# Patient Record
Sex: FEMALE | Race: BLACK OR AFRICAN AMERICAN | URBAN - METROPOLITAN AREA
[De-identification: names, ages, dates, MRNs, and addresses within clinical notes are randomized per-mention and may not be internally consistent; named-entity substitution may affect disease eponyms.]

---

## 2022-01-20 ENCOUNTER — LAB OUTSIDE AN ENCOUNTER (OUTPATIENT)
Dept: URBAN - METROPOLITAN AREA CLINIC 105 | Facility: CLINIC | Age: 67
End: 2022-01-20

## 2022-01-20 ENCOUNTER — WEB ENCOUNTER (OUTPATIENT)
Dept: URBAN - METROPOLITAN AREA CLINIC 105 | Facility: CLINIC | Age: 67
End: 2022-01-20

## 2022-01-20 ENCOUNTER — DASHBOARD ENCOUNTERS (OUTPATIENT)
Age: 67
End: 2022-01-20

## 2022-01-20 ENCOUNTER — OFFICE VISIT (OUTPATIENT)
Dept: URBAN - METROPOLITAN AREA CLINIC 105 | Facility: CLINIC | Age: 67
End: 2022-01-20
Payer: MEDICARE

## 2022-01-20 VITALS
HEIGHT: 61 IN | WEIGHT: 101.8 LBS | SYSTOLIC BLOOD PRESSURE: 123 MMHG | HEART RATE: 89 BPM | DIASTOLIC BLOOD PRESSURE: 83 MMHG | TEMPERATURE: 97.2 F | BODY MASS INDEX: 19.22 KG/M2

## 2022-01-20 DIAGNOSIS — G89.29 OTHER CHRONIC PAIN: ICD-10-CM

## 2022-01-20 DIAGNOSIS — R10.84 GENERALIZED ABDOMINAL PAIN: ICD-10-CM

## 2022-01-20 DIAGNOSIS — M54.6 PAIN IN THORACIC SPINE: ICD-10-CM

## 2022-01-20 DIAGNOSIS — K21.9 GASTROESOPHAGEAL REFLUX DISEASE, UNSPECIFIED WHETHER ESOPHAGITIS PRESENT: ICD-10-CM

## 2022-01-20 DIAGNOSIS — Z86.19 HISTORY OF HELICOBACTER PYLORI INFECTION: ICD-10-CM

## 2022-01-20 DIAGNOSIS — R11.2 NAUSEA WITH VOMITING, UNSPECIFIED: ICD-10-CM

## 2022-01-20 DIAGNOSIS — R63.4 UNINTENTIONAL WEIGHT LOSS: ICD-10-CM

## 2022-01-20 PROBLEM — 79890006: Status: ACTIVE | Noted: 2022-01-20

## 2022-01-20 PROCEDURE — 99204 OFFICE O/P NEW MOD 45 MIN: CPT | Performed by: INTERNAL MEDICINE

## 2022-01-20 RX ORDER — ONDANSETRON 4 MG/1
1 TABLET ON THE TONGUE AND ALLOW TO DISSOLVE TABLET, ORALLY DISINTEGRATING ORAL ONCE A DAY
Status: ACTIVE | COMMUNITY

## 2022-01-20 RX ORDER — OMEPRAZOLE 40 MG/1
1 CAPSULE 30 MINUTES BEFORE MORNING MEAL CAPSULE, DELAYED RELEASE ORAL ONCE A DAY
Status: ACTIVE | COMMUNITY

## 2022-01-20 RX ORDER — SUCRALFATE 1 G/1
1 TABLET ON AN EMPTY STOMACH TABLET ORAL TWICE A DAY
Status: ACTIVE | COMMUNITY

## 2022-01-20 RX ORDER — PROMETHAZINE HYDROCHLORIDE 25 MG/1
1 SUPPOSITORY SUPPOSITORY RECTAL
Qty: 30 | Refills: 2 | OUTPATIENT
Start: 2022-01-20 | End: 2022-04-20

## 2022-01-20 NOTE — HPI-TODAY'S VISIT:
Today, reported symptoms are bilateral subcostal pain, left back pain, nausea/vomiting (couldn't keep anything down), has not had a BM in a week, unintentional weight loss, difficulty swallowing, and gas. She denies diarrhea. Onset was on Jan 14th after eating French fries w/ cranberry pineapple juice the night before. She went to the ER ([facility]) the same day - x-ray and labs done. She was last able to tolerate fluids this morning (herbal tea) - last emesis was on Jan 20th. She was discharged with Zofran SL from the ER - no benefit. She last took the Zofran the day before. She had an EGD at [place] in December - presented for reflux. The EGD noted H. pylori - treated. She has had intermittent abdominal pain since November with a recurrence last week. She notes spinal arthritis, but denies a history of a MVA. She goes to pain management for her back. The patient sees a therapist.  Labs 11/13/13 - Vit D 26.

## 2022-01-21 LAB
A/G RATIO: 2.1
ALBUMIN: 4.6
ALKALINE PHOSPHATASE: 102
ALT (SGPT): 26
AST (SGOT): 16
BASO (ABSOLUTE): 0.1
BASOS: 1
BILIRUBIN, TOTAL: 0.7
BUN/CREATININE RATIO: 22
BUN: 15
CALCIUM: 9.9
CARBON DIOXIDE, TOTAL: 23
CHLORIDE: 95
CREATININE: 0.69
EGFR IF AFRICN AM: 104
EGFR IF NONAFRICN AM: 90
EOS (ABSOLUTE): 0.1
EOS: 1
GLOBULIN, TOTAL: 2.2
GLUCOSE: 85
HEMATOCRIT: 41.3
HEMATOLOGY COMMENTS:: (no result)
HEMOGLOBIN: 13.2
IMMATURE CELLS: (no result)
IMMATURE GRANS (ABS): 0
IMMATURE GRANULOCYTES: 0
LIPASE: 27
LYMPHS (ABSOLUTE): 1.1
LYMPHS: 25
MCH: 31.1
MCHC: 32
MCV: 97
MONOCYTES(ABSOLUTE): 0.5
MONOCYTES: 11
NEUTROPHILS (ABSOLUTE): 2.7
NEUTROPHILS: 62
NRBC: (no result)
PLATELETS: 298
POTASSIUM: 3.5
PROTEIN, TOTAL: 6.8
RBC: 4.24
RDW: 13
SODIUM: 137
T4,FREE(DIRECT): 1.15
TSH: 1.26
WBC: 4.4

## 2022-01-23 PROBLEM — 82423001: Status: ACTIVE | Noted: 2022-01-23

## 2022-01-23 PROBLEM — 235595009: Status: ACTIVE | Noted: 2022-01-23
